# Patient Record
Sex: MALE | NOT HISPANIC OR LATINO | Employment: OTHER | URBAN - NONMETROPOLITAN AREA
[De-identification: names, ages, dates, MRNs, and addresses within clinical notes are randomized per-mention and may not be internally consistent; named-entity substitution may affect disease eponyms.]

---

## 2018-06-21 ENCOUNTER — OFFICE VISIT CONVERTED (OUTPATIENT)
Dept: FAMILY MEDICINE CLINIC | Age: 83
End: 2018-06-21
Attending: FAMILY MEDICINE

## 2021-03-30 ENCOUNTER — OFFICE VISIT CONVERTED (OUTPATIENT)
Dept: CARDIOLOGY | Facility: CLINIC | Age: 86
End: 2021-03-30
Attending: INTERNAL MEDICINE

## 2021-04-14 ENCOUNTER — CONVERSION ENCOUNTER (OUTPATIENT)
Dept: CARDIOLOGY | Facility: CLINIC | Age: 86
End: 2021-04-14
Attending: INTERNAL MEDICINE

## 2021-05-10 NOTE — H&P
History and Physical      Patient Name: Tomás Cueva   Patient ID: [id number]   Sex: Male   YOB: 1935    Primary Care Provider: Yamini ZAMBRANO   Referring Provider: Yamini ZAMBRANO    Visit Date: March 30, 2021    Provider: Donny Patrick MD   Location: [facility]   Location Address: [address] [place] [address] [facility]  [address]  [place], [place]  [id number]   Location Phone: [phone]          Chief Complaint     Syncope, coronary artery disease, establish cardiac care.       History Of Present Illness  Consult requested by: Yamini ZAMBRANO   Tomás Cueva is a 85 year old male with coronary artery disease, previous myocardial infarction and angioplasty, hypertension, and hyperlipidemia. The patient was referred because of a syncopal episode in early February 2021. While he was sitting in a restaurant suddenly he was noted to be leaning forward and dizzy. He appeared confused and per report he almost past out. The whole episode lasted for 3-4 minutes. EMS was called and they examined him, advised going to the ER but he refused. The patient reported that he had clogged up ears at that time which might have caused the episode. Since then he has not had a similar episode. He denies having syncopal episodes in the past as well. Very active at baseline and he still drives. He reports having history of coronary artery disease and myocardial infarction back in 2005. He received angioplasty and stent placement. He has not followed up with a cardiologist for a long time; however, he reports having a stress test done 2 years back which was negative. He denies any chest pain. No shortness of breath. No palpitations. No pedal edema. Antihypertensive medication was reduced recently because of low blood pressure.   PAST MEDICAL HISTORY: (1) Coronary artery disease, previous myocardial infarction. Underwent angioplasty and stent placement in 2005 (Taxus stent to LAD, RCA per stent card).  (2) Hypertension. (3) Hyperlipidemia. (4) Negative for diabetes mellitus.   PSYCHOSOCIAL HISTORY: Denies tobacco use. Reports moderate alcohol consumption. No recreational drug usage. No mood change or depression.   FAMILY HISTORY: No family history of premature coronary artery disease.   CURRENT MEDICATIONS: Zetia 10 mg daily; losartan 12.5 mg daily; Toprol 50 mg daily; atorvastatin 40 mg daily; aspirin 81 mg daily.   ALLERGIES: No known drug allergies.       Review of Systems  · Constitutional  o Admits  o : good general health lately  o Denies  o : fatigue, recent weight changes   · Eyes  o Admits  o : double vision  · HENT  o Denies  o : hearing loss or ringing, chronic sinus problem, swollen glands in neck  · Cardiovascular  o Denies  o : chest pain, palpitations (fast, fluttering, or skipping beats), swelling (feet, ankles, hands), shortness of breath while walking or lying flat  · Respiratory  o Denies  o : chronic or frequent cough, asthma or wheezing, COPD  · Gastrointestinal  o Denies  o : ulcers, nausea or vomiting  · Neurologic  o Denies  o : lightheaded or dizzy, stroke, headaches  · Musculoskeletal  o Denies  o : joint pain, back pain  · Endocrine  o Denies  o : thyroid disease, diabetes, heat or cold intolerance, excessive thirst or urination  · Heme-Lymph  o Denies  o : bleeding or bruising tendency, anemia      Vitals  Date Time BP Position Site L\R Cuff Size HR RR TEMP (F) WT  HT  BMI kg/m2 BSA m2 O2 Sat FR L/min FiO2 HC       03/30/2021 01:25 /64 Sitting    84 - R   192lbs 0oz 6'   26.04 2.1             Physical Examination  · Constitutional  o Appearance  o : Awake, alert, in no acute distress.  · Head and Face  o HEENT  o : No pallor, anicteric. Eyes normal. Moist mucous membranes.  · Neck  o Inspection/Palpation  o : Supple.   o Jugular Veins  o : No JVD. No carotid bruits.  · Respiratory  o Auscultation of Lungs  o : Clear to auscultation bilaterally. No crackles or  wheezing.  · Cardiovascular  o Heart  o : S1, S2 is normally heard. No S3. No murmur, rubs, or gallops.  · Gastrointestinal  o Abdominal Examination  o : Soft, non-distended. No palpable hepatosplenomegaly. Bowel sounds heard in all four quadrants.  · Musculoskeletal  o General  o : Normal muscle tone and strength.  · Skin and Subcutaneous Tissue  o General Inspection  o : No skin rashes.  · Extremities  o Extremities  o : Warm and well perfused. Distal pulses present. No pitting pedal edema.  · EKG  o EKG  o : EKG done at PCP's office on 02/02/2021 showed sinus rhythm, first-degree AV block, nonspecific T-wave abnormalities, abnormal EKG.   · Labs  o Labs  o : No recent labs available for review.           Assessment     ASSESSMENT & PLAN:    1.  Syncopal episode.  Single presyncopal episode.  Etiology unclear.  The patient did not go to the ER.  No        symptoms since then.  Neurocardiogenic syncope is a possibility.  Because of absence of recurrent        symptoms a detailed workup is not indicated; however, we will proceed with an echocardiogram to assess        the LV function and rule out any significant valvular abnormalities.   2.  Coronary artery disease, with previous angioplasty.  Currently stable with no angina.  Echocardiogram as        mentioned above.  Continue aspirin, statin, and beta blocker.   3.  Hypertension, very well controlled.   4.  Will follow the echocardiogram reports; otherwise, follow up in 6 months.      Donny Patrick MD   JV/rt                Electronically Signed by: Radha Davis-, Other -Author on April 9, 2021 03:57:23 PM  Electronically Co-signed by: Donny Patrick MD -Reviewer on April 12, 2021 08:39:53 AM

## 2021-05-11 NOTE — PROCEDURES
"   Procedure Note      Patient Name: Tomás Cueva   Patient ID: [id number]   Sex: Male   YOB: 1935    Primary Care Provider: Yamini ZAMBRANO   Referring Provider: Yamini ZAMBRANO    Visit Date: April 14, 2021    Provider: Donny Patrick MD   Location: [facility]   Location Address: [address] [place] [address]  [address]  [place], [place]  [id number]   Location Phone: [phone]          FINAL REPORT   TRANSTHORACIC ECHOCARDIOGRAM REPORT    Diagnosis: Syncope and Coronary artery disease   Height: 6'0\" Weight: 192 B/P: 124/64 BSA: 2.09   Tech: JLW   MEASUREMENTS:  RVID (Diastole) : RVID. (NORMAL: 0.7 to 2.4 cm max)   LVID (Systole): 2.9 cm (Diastole): 4.4 cm . (NORMAL: 3.7 - 5.4 cm)   Posterior Wall Thickness (Diastole): 1.1 cm. (NORMAL: 0.8 - 1.1 cm)   Septal Thickness (Diastole): 1 cm. (NORMAL: 0.7 - 1.2 cm)   LAID (Systole): 3.7 cm. (NORMAL: 1.9 - 3.8 cm)   Aortic Root Diameter (Diastole): 3.2 cm. (NORMAL: 2.0 - 3.7 cm)   COMMENTS:  The patient underwent 2-D, M-Mode, and Doppler examination, including pulse-wave, continuous-wave, and color-flow analysis; the study is technically adequate.   FINDINGS:  MITRAL VALVE: Normal in appearance, opens well. No evidence of mitral valve prolapse. No mitral stenosis. Trace mitral regurgitation.   AORTIC VALVE: Mild calcification of the aortic valve leaflets noted, trileaflet aortic valve which opens well. No hemodynamically significant aortic stenosis or regurgitation noted.   TRICUSPID VALVE: Normal in appearance, opens well. Trace tricuspid regurgitation. Normal pulmonary artery systolic pressure.   PULMONIC VALVE: Grossly normal.   AORTIC ROOT: Normal in size with adequate motion.   LEFT ATRIUM: Mild left atrial enlargement. No intracavitary masses or clots seen. LA volume index is 34 mL/m2.   LEFT VENTRICLE: The left ventricular chamber size is normal. There is mild asymmetric basal septal hypertrophy of the left ventricle. No evidence of " left ventricular outflow tract obstruction. Left ventricular systolic function is normal. Estimated LV ejection fraction is 55-60%. There are no regional wall motion abnormalities.   RIGHT VENTRICLE: Normal size and function.   RIGHT ATRIUM: Normal in size.   PERICARDIUM: No evidence of pericardial effusion. Moderate-sized anterior pericardial fat pad seen.   INFERIOR VENA CAVA: Measures 1.5 cm in diameter. There is more than 50% collapse during inspiration.   DOPPLER: E/A ratio is 2.4.DT= 157 msec. IVRT is 95 msec. E/E' is 20.   Faxed: 04/24/2021      CONCLUSION:    1.  Preserved left ventricular systolic function with an estimated LV ejection fraction of 55-60%.  2.  Diastolic dysfunction of the left ventricle.  3.  Mild left atrial enlargement.  4.  There are no hemodynamically significant valvular abnormalities.                 Electronically Signed by: Xiomy Gomes-, Other -Author on April 24, 2021 07:36:46 AM  Electronically Co-signed by: Donny Patrick MD -Reviewer on April 24, 2021 07:53:11 AM

## 2021-05-14 VITALS
BODY MASS INDEX: 26.01 KG/M2 | HEIGHT: 72 IN | SYSTOLIC BLOOD PRESSURE: 124 MMHG | WEIGHT: 192 LBS | HEART RATE: 84 BPM | DIASTOLIC BLOOD PRESSURE: 64 MMHG

## 2021-05-18 NOTE — PROGRESS NOTES
"Tomás Cueva. 1935     Office/Outpatient Visit    Visit Date: Thu, Jun 21, 2018 11:32 am    Provider: Mounika Bhat MD (Assistant: Lola Villalta RN)    Location: [facility]        Electronically signed by Mounika Bhat MD on  06/21/2018 02:52:13 PM                             SUBJECTIVE:        CC:     Mr. Cueva is a 83 year old White male.  Establish Care/Sore throat PHQ-9 Score: 0 and Alcohol Score: 2         HPI:     Tomás is here today for c/o sore throat.  He started with sx yesterday.  He could \"barely sleep last night.\"  +Hot salt water gargle but this did not help.  No fevers or chills, +fatigue and malaise.  +Rhinorrhea and congestion.  +Cough, minimally productive.   +Headache.  +Sinus pain and pressure.  No CP or SOB.  No N/V/D.  No myalgias.  Sick contacts:  none known.     ROS:     CONSTITUTIONAL:  Positive for fatigue and malaise.   Negative for chills or fever.      EYES:  Negative for blurred vision.      E/N/T:  Positive for nasal congestion, frequent rhinorrhea and sore throat.   Negative for ear pain or diminished hearing.      CARDIOVASCULAR:  Negative for chest pain.      RESPIRATORY:  Positive for recent cough.   Negative for dyspnea.      GASTROINTESTINAL:  Negative for abdominal pain, diarrhea, nausea and vomiting.      MUSCULOSKELETAL:  Negative for myalgias.          PMH/FMH/SH:     Last Reviewed on 6/21/2018 12:00 PM by Mounika Bhat    Past Medical History:             PAST MEDICAL HISTORY         Coronary Artery Disease    Hyperlipidemia    Hypertension         Surgical History:         Cardiac Stent X3 2004, Cardiac cath 2004;      Hernia Repair: hiatal 2007;     Frontal Hematoma evacuation 1980;         Family History:     Father: Myocardial Infarction     Mother: Myocardial Infarction         Social History:     Occupation:    Retired     Marital Status: Single     Children: 2 children         Tobacco/Alcohol/Supplements:     Last Reviewed on 6/21/2018 " 12:00 PM by Mounika Bhat    Tobacco: He has a past history of cigarette smoking; quit date:  Quit Date 1973.          Alcohol: Frequency: Socially         Substance Abuse History:     Last Reviewed on 6/21/2018 12:00 PM by Mounika Bhat        Mental Health History:     Last Reviewed on 6/21/2018 12:00 PM by Mounika Bhat        Communicable Diseases (eg STDs):     Last Reviewed on 6/21/2018 12:00 PM by Mounika Bhat            Current Problems:       None Recorded         Immunizations:     None        Allergies:     Last Reviewed on 6/21/2018 11:37 AM by Lola Villalta      No Known Drug Allergies.         Current Medications:     Last Reviewed on 6/21/2018 11:39 AM by Lola Villalta    Losartan 25mg Tablet Take 1 tablet(s) by mouth daily     Metoprolol 50mg Tablets, Extended Release 1 tab daily     Simvastatin 40mg Tablet 1 tab daily     Zetia 10mg Tablet 1 tab daily         OBJECTIVE:        Vitals:         Current: 6/21/2018 11:36:50 AM    Ht:  5 ft, 11 in;  Wt: 200 lbs;  BMI: 27.9    T: 98.5 F (oral);  BP: 151/68 mm Hg (left arm, sitting);  P: 78 bpm (left arm (BP Cuff), sitting)        Repeat:     11:46:38 AM     BP:   130/68mm Hg (left arm, sitting)         Exams:     PHYSICAL EXAM:     GENERAL: Vitals recorded well developed, well nourished;  well groomed;  no apparent distress;     EYES: extraocular movements intact; conjunctiva and cornea are normal; PERRLA;     E/N/T: EARS:  normal external auditory canals and tympanic membranes;  grossly normal hearing; NOSE: nasal mucosa is edematous and erythematous;  OROPHARYNX: oral mucosa reveals erythema;  posterior pharynx shows no exudate;     RESPIRATORY: normal respiratory rate and pattern with no distress; normal breath sounds with no rales, rhonchi, wheezes or rubs;     CARDIOVASCULAR: normal rate; rhythm is regular;  no systolic murmur; no edema;     GASTROINTESTINAL: nontender; normal bowel sounds;     LYMPHATIC: no enlargement of cervical or  facial nodes;     MUSCULOSKELETAL: normal gait; normal overall tone         Lab/Test Results:             Rapid Strep Screen:  Negative (06/21/2018),     Performed by::  tls (06/21/2018),             ASSESSMENT           462   J00  Sore throat              DDx:         ORDERS:         Lab Orders:       [id number]  Group A Streptococcus detection by immunoassay with direct optical observation  (In-House)         [id number]  [facility] Throat culture, strep  (Send-Out)                   PLAN:          Sore throat Viral URI.  No evidence of bacterial infection.  Symptomatic care recommended with OTC analgesics for pain/fever, OTC decongestants and cough suppressants prn.  Stay well hydrated.  Humidifier in the bedroom at night.  Hot tea with lemon and honey.  RTC prn worsening or failure to improve of sx.           Orders:       [id number]  Group A Streptococcus detection by immunoassay with direct optical observation  (In-House)         [id number]  [facility] Throat culture, strep  (Send-Out)             Patient Education Handouts:       Common Cold              CHARGE CAPTURE           **Please note: ICD descriptions below are intended for billing purposes only and may not represent clinical diagnoses**        Primary Diagnosis:         462 Sore throat            J00    Acute nasopharyngitis [common cold]              Orders:          [id number]   Office visit - new pt, level 2  (In-House)             [id number]   Group A Streptococcus detection by immunoassay with direct optical observation  (In-House)

## 2021-07-01 VITALS
SYSTOLIC BLOOD PRESSURE: 130 MMHG | BODY MASS INDEX: 28 KG/M2 | TEMPERATURE: 98.5 F | DIASTOLIC BLOOD PRESSURE: 68 MMHG | WEIGHT: 200 LBS | HEIGHT: 71 IN | HEART RATE: 78 BPM

## 2021-10-27 ENCOUNTER — TELEPHONE (OUTPATIENT)
Dept: CARDIOLOGY | Facility: CLINIC | Age: 86
End: 2021-10-27

## 2023-06-13 ENCOUNTER — OFFICE VISIT (OUTPATIENT)
Dept: SPORTS MEDICINE | Facility: CLINIC | Age: 88
End: 2023-06-13
Payer: MEDICARE

## 2023-06-13 VITALS
TEMPERATURE: 97.8 F | HEART RATE: 82 BPM | DIASTOLIC BLOOD PRESSURE: 72 MMHG | BODY MASS INDEX: 26.01 KG/M2 | WEIGHT: 192 LBS | HEIGHT: 72 IN | SYSTOLIC BLOOD PRESSURE: 121 MMHG

## 2023-06-13 DIAGNOSIS — G89.29 CHRONIC PAIN OF RIGHT KNEE: Primary | ICD-10-CM

## 2023-06-13 DIAGNOSIS — M25.561 CHRONIC PAIN OF RIGHT KNEE: Primary | ICD-10-CM

## 2023-06-13 DIAGNOSIS — M17.11 PATELLOFEMORAL ARTHRITIS OF RIGHT KNEE: ICD-10-CM

## 2023-06-13 PROCEDURE — 99203 OFFICE O/P NEW LOW 30 MIN: CPT | Performed by: STUDENT IN AN ORGANIZED HEALTH CARE EDUCATION/TRAINING PROGRAM

## 2023-06-13 PROCEDURE — 1159F MED LIST DOCD IN RCRD: CPT | Performed by: STUDENT IN AN ORGANIZED HEALTH CARE EDUCATION/TRAINING PROGRAM

## 2023-06-13 PROCEDURE — 1160F RVW MEDS BY RX/DR IN RCRD: CPT | Performed by: STUDENT IN AN ORGANIZED HEALTH CARE EDUCATION/TRAINING PROGRAM

## 2023-06-13 RX ORDER — ASPIRIN 81 MG/1
81 TABLET ORAL DAILY
COMMUNITY

## 2023-06-13 RX ORDER — LOSARTAN POTASSIUM 25 MG/1
25 TABLET ORAL DAILY
Qty: 30 TABLET | Refills: 11 | COMMUNITY
Start: 2023-04-16 | End: 2024-04-15

## 2023-06-13 RX ORDER — ATORVASTATIN CALCIUM 40 MG/1
40 TABLET, FILM COATED ORAL DAILY
Qty: 30 TABLET | Refills: 11 | COMMUNITY
Start: 2023-04-16 | End: 2024-04-15

## 2023-06-13 RX ORDER — EZETIMIBE 10 MG/1
10 TABLET ORAL DAILY
Qty: 30 TABLET | Refills: 11 | COMMUNITY
Start: 2023-04-16 | End: 2024-04-15

## 2023-06-13 NOTE — PROGRESS NOTES
"Chief Complaint   Patient presents with    Right Knee - Initial Evaluation       History of Present Illness  Tomás is a 88 y.o. year old male with PMH significant for but not limited to CAD, MI status post stent placement, and HTN, here today for chronic right knee pain. Pain has been present 6 months with no known injury or trauma. Pain is currently rated 2/10 and described as a throbbing pain. Pain is located on the anterior aspect (points towards medial aspect of patella). Denies any swelling, weakness, numbness or tingling. Pain worsens with direct pressure like with kneeling. Has been managing symptoms with Tylenol as needed, usually about once per day. Denies any previous injury or occurrence.     Review of Systems   HENT:  Positive for dental problem.    All other systems reviewed and are negative.    /72 (BP Location: Right arm, Patient Position: Sitting, Cuff Size: Adult)   Pulse 82   Temp 97.8 °F (36.6 °C) (Infrared)   Ht 182.9 cm (72\")   Wt 87.1 kg (192 lb)   BMI 26.04 kg/m²        Physical Exam  Vital signs reviewed.   General: Well developed, well nourished; No acute distress.  Eyes: conjunctiva clear; pupils equally round and reactive  ENT: external ears and nose atraumatic; hearing normal  CV: no peripheral edema, 2+ distal pulses  Resp: normal respiratory effort, no use of accessory muscles  Skin: normal color and pigmentation; no rashes or wounds; normal turgor  Psych: alert and oriented; mood and affect appropriate; initially confused telling the MA that he thought he was here to discuss his hearing  Neuro: sensation to light touch intact    MSK Exam:  The right knee is without obvious signs of acute bony deformity, quadriceps atrophy, swelling, erythema, ecchymosis or joint effusion. The facets are tender. Apprehension is negative with medial and lateral glide. Patella crepitus is positive. The medial and lateral joint lines are nontender and without bony crepitus or step-off. Soft " tissue including the distal hamstring tendons, pes anserine, quad tendon, patellar tendon, proximal gastroc tendon, distal IT band, and Gerdy's tubercle are nontender. Extension is initially limited secondary to impaired flexibility of the hamstrings, though he is able reach full extension with increased effort. Flexion is limited roughly 10 degrees compared to the left but is pain-free. Seated knee and hip strength is 5/5 and symmetrical. Resisted SLR and side-lying hip abduction demonstrate 4/5 weakness.  Varus & valgus stress, Lachman's, anterior drawer, Jac's, and posterior drawer are all negative. The opposite knee is nontender and stable. Gait is slow, but pain-free and tandem.    Right Knee X-Ray  Indication: Pain  Views: AP, Lateral, and [place]  Findings:  No fracture  No bony lesion  Normal soft tissues  Mild medial joint space narrowing  Mild to moderate patellofemoral spurring and joint space narrowing  No prior studies were available for comparison.      Assessment and Plan  Diagnoses and all orders for this visit:    1. Chronic pain of right knee (Primary)  -     Ambulatory Referral to Physical Therapy Evaluate and treat; Stretching, ROM, Strengthening    2. Patellofemoral arthritis of right knee  -     Ambulatory Referral to Physical Therapy Evaluate and treat; Stretching, ROM, Strengthening    Tomás is a 88 y.o. year old male with PMH significant for but not limited to CAD, MI status post stent placement, and HTN, here today for chronic right knee pain that has been present 6 months with no known injury or trauma.  We reviewed images and exam findings, which are consistent with patellofemoral osteoarthritis.  We discussed conservative and surgical treatment options.  An order was placed for physical therapy to work on range of motion and strengthening, including of the hips and core.  Given his cardiovascular history, I recommended against the use of oral anti-inflammatories.  He may continue  with Tylenol as well as ice and/or heat as needed.  I also recommended a trial of topical Voltaren to be used 3-4 times daily as needed.  He may continue with activity as tolerated, but should avoid painful or overly strenuous activity.  I stressed the importance of regular low impact activity. We will follow-up in 6 weeks. All of his questions were answered and he is agreeable with the plan.    Dictated utilizing Dragon dictation.

## 2023-06-15 ENCOUNTER — PATIENT ROUNDING (BHMG ONLY) (OUTPATIENT)
Dept: SPORTS MEDICINE | Facility: CLINIC | Age: 88
End: 2023-06-15
Payer: MEDICARE

## 2023-06-15 NOTE — PROGRESS NOTES
Nicolle 15, 2023      A Welcome Card has been sent to the patient for PATIENT ROUNDING with [facility]

## 2023-10-18 ENCOUNTER — OFFICE VISIT (OUTPATIENT)
Dept: SPORTS MEDICINE | Facility: CLINIC | Age: 88
End: 2023-10-18
Payer: OTHER MISCELLANEOUS

## 2023-10-18 VITALS
HEIGHT: 72 IN | OXYGEN SATURATION: 95 % | RESPIRATION RATE: 14 BRPM | HEART RATE: 76 BPM | TEMPERATURE: 97.8 F | WEIGHT: 192 LBS | SYSTOLIC BLOOD PRESSURE: 114 MMHG | DIASTOLIC BLOOD PRESSURE: 75 MMHG | BODY MASS INDEX: 26.01 KG/M2

## 2023-10-18 DIAGNOSIS — S40.012A CONTUSION OF LEFT SHOULDER, INITIAL ENCOUNTER: ICD-10-CM

## 2023-10-18 DIAGNOSIS — M62.838 MUSCLE SPASM OF SHOULDER REGION: ICD-10-CM

## 2023-10-18 DIAGNOSIS — M62.838 CERVICAL PARASPINOUS MUSCLE SPASM: ICD-10-CM

## 2023-10-18 DIAGNOSIS — S43.102A SEPARATION OF LEFT ACROMIOCLAVICULAR JOINT, INITIAL ENCOUNTER: Primary | ICD-10-CM

## 2023-10-18 DIAGNOSIS — S13.4XXA WHIPLASH INJURY TO NECK, INITIAL ENCOUNTER: ICD-10-CM

## 2023-10-18 PROCEDURE — 99214 OFFICE O/P EST MOD 30 MIN: CPT | Performed by: STUDENT IN AN ORGANIZED HEALTH CARE EDUCATION/TRAINING PROGRAM

## 2023-11-01 ENCOUNTER — OFFICE VISIT (OUTPATIENT)
Dept: SPORTS MEDICINE | Facility: CLINIC | Age: 88
End: 2023-11-01
Payer: MEDICARE

## 2023-11-01 VITALS — TEMPERATURE: 97.1 F | BODY MASS INDEX: 26.01 KG/M2 | HEIGHT: 72 IN | WEIGHT: 192 LBS

## 2023-11-01 DIAGNOSIS — S43.102D SEPARATION OF LEFT ACROMIOCLAVICULAR JOINT, SUBSEQUENT ENCOUNTER: Primary | ICD-10-CM

## 2023-11-01 DIAGNOSIS — S13.4XXA WHIPLASH INJURY TO NECK, INITIAL ENCOUNTER: ICD-10-CM

## 2023-11-01 DIAGNOSIS — S22.21XD CLOSED FRACTURE OF MANUBRIUM WITH ROUTINE HEALING, SUBSEQUENT ENCOUNTER: ICD-10-CM

## 2023-11-01 DIAGNOSIS — S40.012A CONTUSION OF LEFT SHOULDER, INITIAL ENCOUNTER: ICD-10-CM

## 2023-11-01 PROCEDURE — 1159F MED LIST DOCD IN RCRD: CPT | Performed by: STUDENT IN AN ORGANIZED HEALTH CARE EDUCATION/TRAINING PROGRAM

## 2023-11-01 PROCEDURE — 1160F RVW MEDS BY RX/DR IN RCRD: CPT | Performed by: STUDENT IN AN ORGANIZED HEALTH CARE EDUCATION/TRAINING PROGRAM

## 2023-11-01 PROCEDURE — 99213 OFFICE O/P EST LOW 20 MIN: CPT | Performed by: STUDENT IN AN ORGANIZED HEALTH CARE EDUCATION/TRAINING PROGRAM
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
## 2023-12-13 ENCOUNTER — OFFICE VISIT (OUTPATIENT)
Dept: CARDIOLOGY | Facility: CLINIC | Age: 88
End: 2023-12-13
Payer: MEDICARE

## 2023-12-13 VITALS
HEART RATE: 89 BPM | WEIGHT: 186 LBS | HEIGHT: 72 IN | SYSTOLIC BLOOD PRESSURE: 124 MMHG | BODY MASS INDEX: 25.19 KG/M2 | DIASTOLIC BLOOD PRESSURE: 78 MMHG

## 2023-12-13 DIAGNOSIS — I10 ESSENTIAL HYPERTENSION: ICD-10-CM

## 2023-12-13 DIAGNOSIS — I48.19 PERSISTENT ATRIAL FIBRILLATION: Primary | ICD-10-CM

## 2023-12-13 DIAGNOSIS — I25.10 CORONARY ARTERY DISEASE INVOLVING NATIVE CORONARY ARTERY OF NATIVE HEART WITHOUT ANGINA PECTORIS: ICD-10-CM

## 2023-12-13 DIAGNOSIS — E78.2 MIXED HYPERLIPIDEMIA: ICD-10-CM

## 2023-12-13 PROCEDURE — 1159F MED LIST DOCD IN RCRD: CPT | Performed by: INTERNAL MEDICINE

## 2023-12-13 PROCEDURE — 93000 ELECTROCARDIOGRAM COMPLETE: CPT | Performed by: INTERNAL MEDICINE

## 2023-12-13 PROCEDURE — 1160F RVW MEDS BY RX/DR IN RCRD: CPT | Performed by: INTERNAL MEDICINE

## 2023-12-13 PROCEDURE — 99214 OFFICE O/P EST MOD 30 MIN: CPT | Performed by: INTERNAL MEDICINE
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
## 2023-12-16 PROBLEM — I25.10 CORONARY ARTERY DISEASE INVOLVING NATIVE CORONARY ARTERY OF NATIVE HEART WITHOUT ANGINA PECTORIS: Status: ACTIVE | Noted: 2023-12-16

## 2023-12-16 PROBLEM — E78.2 MIXED HYPERLIPIDEMIA: Status: ACTIVE | Noted: 2023-12-16

## 2023-12-16 PROBLEM — I48.19 PERSISTENT ATRIAL FIBRILLATION: Status: ACTIVE | Noted: 2023-12-16

## 2023-12-16 PROBLEM — I10 ESSENTIAL HYPERTENSION: Status: ACTIVE | Noted: 2023-12-16

## 2023-12-16 NOTE — ASSESSMENT & PLAN NOTE
No recent lipid panel available for review.  Continue atorvastatin and Zetia at the current dose.  Will get the latest lipid panel from PCP office.

## 2023-12-16 NOTE — ASSESSMENT & PLAN NOTE
He is currently stable with no angina.  LV ejection fraction is normal per echocardiogram in 2021.  Continue statins.  We are stopping aspirin since he is being started on Eliquis, to minimize bleeding risks.

## 2023-12-16 NOTE — ASSESSMENT & PLAN NOTE
He was noted to be in irregular rhythm on physical examination.  EKG confirmed presence of atrial fibrillation.  This is a new diagnosis for the patient.  Ventricular rates are well-controlled.  He is asymptomatic.  Management plans including the need for anticoagulation discussed in detail with the patient.  Labs in October showed normal hemoglobin and renal function.  Will start Eliquis 5 mg twice daily.  Echocardiogram will be done to reevaluate LV function and rule out significant valvular abnormalities.  Will discontinue aspirin to minimize bleeding risks.

## 2023-12-26 ENCOUNTER — TELEPHONE (OUTPATIENT)
Dept: ORTHOPEDIC SURGERY | Facility: CLINIC | Age: 88
End: 2023-12-26
Payer: COMMERCIAL

## 2023-12-26 NOTE — TELEPHONE ENCOUNTER
PT CALLED AND IS SCHEDULED FOR 1/8. PT UNDERSTOOD THAT OUR OFFICE IS CLOSING AND WILL FOLLOW UP WITH KENIA AT THE NEW OFFICE.

## 2023-12-28 NOTE — TELEPHONE ENCOUNTER
PT CALLED TO CONFIRM THAT THE OFFICE IS CLOSING AND HE STATED THAT HE WILL SEEK CARE WITH KENIA AND DR SHERWOOD IN THE FUTURE.

## 2024-01-15 ENCOUNTER — TELEPHONE (OUTPATIENT)
Dept: CARDIOLOGY | Facility: CLINIC | Age: 89
End: 2024-01-15
Payer: COMMERCIAL

## 2024-01-15 NOTE — TELEPHONE ENCOUNTER
----- Message from Sherita Koenig RN sent at 1/15/2024  9:37 AM EST -----    ----- Message -----  From: Donny Patrick MD  Sent: 1/12/2024   1:48 PM EST  To: Sherita Koenig RN    Echocardiogram showed normal heart function.  There are no major valvular problems.    Continue all the current medications.  Needed 2-month follow-up in [facility].  May be schedule with KENYA Live.      Electronically signed by Donny Patrick MD, 01/12/24, 1:48 PM EST.

## 2024-01-15 NOTE — TELEPHONE ENCOUNTER
Echocardiogram showed normal heart function.  There are no major valvular problems.     Continue all the current medications.  Needed 2-month follow-up in [facility].  May be schedule with KENYA Live.  Pt advised, already has appt with Nena in March.
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
## 2024-03-12 ENCOUNTER — OFFICE VISIT (OUTPATIENT)
Dept: CARDIOLOGY | Facility: CLINIC | Age: 89
End: 2024-03-12
Payer: MEDICARE

## 2024-03-12 VITALS
HEIGHT: 72 IN | HEART RATE: 84 BPM | DIASTOLIC BLOOD PRESSURE: 90 MMHG | BODY MASS INDEX: 25.87 KG/M2 | WEIGHT: 191 LBS | SYSTOLIC BLOOD PRESSURE: 136 MMHG

## 2024-03-12 DIAGNOSIS — I25.10 CORONARY ARTERY DISEASE INVOLVING NATIVE CORONARY ARTERY OF NATIVE HEART WITHOUT ANGINA PECTORIS: ICD-10-CM

## 2024-03-12 DIAGNOSIS — E78.2 MIXED HYPERLIPIDEMIA: ICD-10-CM

## 2024-03-12 DIAGNOSIS — I48.19 PERSISTENT ATRIAL FIBRILLATION: Primary | ICD-10-CM

## 2024-03-12 DIAGNOSIS — I10 ESSENTIAL HYPERTENSION: ICD-10-CM

## 2024-03-12 PROCEDURE — 99214 OFFICE O/P EST MOD 30 MIN: CPT | Performed by: FAMILY MEDICINE

## 2024-03-12 PROCEDURE — 1159F MED LIST DOCD IN RCRD: CPT | Performed by: FAMILY MEDICINE

## 2024-03-12 PROCEDURE — 1160F RVW MEDS BY RX/DR IN RCRD: CPT | Performed by: FAMILY MEDICINE

## 2024-03-12 RX ORDER — METOPROLOL SUCCINATE 25 MG/1
25 TABLET, EXTENDED RELEASE ORAL DAILY
COMMUNITY

## 2024-03-12 RX ORDER — NITROGLYCERIN 80 MG/1
1 PATCH TRANSDERMAL DAILY
COMMUNITY

## 2024-03-12 NOTE — ASSESSMENT & PLAN NOTE
He is stable without symptoms of angina.  He has preserved LV systolic function.  Aspirin was discontinued after initiating Eliquis.  Continue daily statin.

## 2024-03-12 NOTE — ASSESSMENT & PLAN NOTE
Uncertain lipid control.  For now continue current dose atorvastatin and Zetia, and will check fasting lipid profile before next routine follow-up.

## 2024-03-12 NOTE — ASSESSMENT & PLAN NOTE
Blood pressure is well-controlled.  Previous electrolyte renal function within normal.  Continue current dose losartan.
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
## 2024-03-12 NOTE — ASSESSMENT & PLAN NOTE
He remains in atrial fibrillation with controlled ventricular rates.  He has no complaints of palpitations.  Echocardiogram showed normal LV systolic function, with mild mitral valve regurgitation.  Will continue anticoagulation with Eliquis for CVA protection.  Denies any signs or symptoms of bleeding, will recheck blood counts before next routine follow-up visit.

## 2024-07-12 ENCOUNTER — TELEPHONE (OUTPATIENT)
Dept: CARDIOLOGY | Facility: CLINIC | Age: 89
End: 2024-07-12
Payer: COMMERCIAL

## 2024-07-12 NOTE — TELEPHONE ENCOUNTER
Procedure: Left Reverse Shoulder Replacement    Medication Directive: Eliquis    PMH: AFIB, HLD, HTN, CAD    Last Seen: 12/13/23    ECHO: 01/10/24     Left ventricular systolic function is normal. Left ventricular ejection fraction appears to be 56 - 60%.    Left ventricular diastolic function was indeterminate due to presence of atrial fibrillation.    The left atrial cavity is mild to moderately dilated.    Mild mitral valve regurgitation is present.    Estimated right ventricular systolic pressure from tricuspid regurgitation is normal (<35 mmHg).

## 2024-07-15 NOTE — TELEPHONE ENCOUNTER
Patient may proceed with upcoming shoulder surgery at moderate risk, may hold Eliquis for 2 days prior to procedure.

## 2024-09-18 ENCOUNTER — OFFICE VISIT (OUTPATIENT)
Dept: CARDIOLOGY | Facility: CLINIC | Age: 89
End: 2024-09-18
Payer: MEDICARE

## 2024-09-18 VITALS
BODY MASS INDEX: 24.52 KG/M2 | DIASTOLIC BLOOD PRESSURE: 80 MMHG | HEIGHT: 72 IN | SYSTOLIC BLOOD PRESSURE: 119 MMHG | HEART RATE: 91 BPM | WEIGHT: 181 LBS

## 2024-09-18 DIAGNOSIS — Z01.810 PREOPERATIVE CARDIOVASCULAR EXAMINATION: ICD-10-CM

## 2024-09-18 DIAGNOSIS — E78.2 MIXED HYPERLIPIDEMIA: ICD-10-CM

## 2024-09-18 DIAGNOSIS — I48.19 PERSISTENT ATRIAL FIBRILLATION: ICD-10-CM

## 2024-09-18 DIAGNOSIS — I25.10 CORONARY ARTERY DISEASE INVOLVING NATIVE CORONARY ARTERY OF NATIVE HEART WITHOUT ANGINA PECTORIS: Primary | ICD-10-CM

## 2024-09-18 DIAGNOSIS — I10 ESSENTIAL HYPERTENSION: ICD-10-CM

## 2024-09-18 RX ORDER — NITROGLYCERIN 0.4 MG/1
0.4 TABLET SUBLINGUAL
COMMUNITY